# Patient Record
(demographics unavailable — no encounter records)

---

## 2024-11-27 NOTE — HISTORY OF PRESENT ILLNESS
[de-identified] : Burning with Urination [FreeTextEntry6] : Started last night, better today. No fever. Urinary frequency last night, better today. No stomachache, no back pain, no nausea or vomiting. Normal BMs today. No vaginal discharge. No vaginal itching.

## 2024-11-27 NOTE — PHYSICAL EXAM
[NL] : soft, nontender, nondistended, normal bowel sounds, no hepatosplenomegaly [Damaso: ____] : Damaso [unfilled] [Normal External Genitalia] : normal external genitalia [Erythematous Labia Minora] : erythematous labia minora [Erythematous Labia Majora] : erythematous labia majora [Excoriations in Perineum] : no excoriations in perineum [FreeTextEntry9] : no CVA tenderness

## 2024-11-27 NOTE — DISCUSSION/SUMMARY
[FreeTextEntry1] : 8 year F with acute vulvovaginitis. Rec: -Topical Vaseline, Aquaphor, Desitin, or A&D 2-3x/day.  -Avoid sleeper pajamas. Nightgowns allow air to circulate. -Use cotton underpants. Double-rinse underwear after washing to avoid residual irritants. Do not use fabric softeners for underwear and swimsuits. -Avoid tights, leotards, and leggings. Skirts and loose-fitting pants allow air to circulate. -Daily warm bathing is helpful as follows: Allow the child to soak in clean water (no soap) for 10 to 15 minutes. Use soap to wash regions other than the genital area just before taking the child out of the tub. Limit use of any soap on genital areas. Rinse the genital area well and gently pat dry. -A hair dryer on the cool setting may be helpful to assist with drying the genital region. -Do not use bubble baths or perfumed soaps. -If the vulvar area is tender or swollen, cool compresses may relieve the discomfort. Wet wipes can be used instead of toilet paper for wiping. Emollients may help protect skin. -Review hygiene with the child. Emphasize wiping front-to-back after bowel movements. Have her sit with knees apart to reduce reflux of urine into the vagina. If she has trouble with this position because of small size, she can use a smaller detachable seat or sit backwards on the toilet (facing the toilet). Children younger than five should be supervised or assisted in toilet hygiene. -Avoid letting children sit in wet swimsuits for long periods of time after swimming.  Return if symptoms worsen or persist.   Urine dip in office neg. Will send culture to be sure.

## 2024-12-06 NOTE — HISTORY OF PRESENT ILLNESS
[de-identified] : Cough and ADHD [FreeTextEntry6] : x 2 weeks. Getting worse. Waking her up overnight and had 1 episode of post-tussive emesis. Using albuterol which helps some. No fever. Congested. Multiple classmates with walking pneumonia.  Also having difficulty sitting still and focusing with her current dose of ADHD meds. This does was working for a long time but isn't anymore. Rachel would like to try the next dose up.

## 2024-12-06 NOTE — DISCUSSION/SUMMARY
[FreeTextEntry1] : 7 yo F with atypical PNA, likely mycoplasma infection given current high community spread. Will treat with azithromycin + albuterol prn for symptoms. RTC if not improved after abx, worsening, or other new symptoms.  Suboptimal control of ADHD currently so will add 5 mg to current dose and reeval.

## 2024-12-06 NOTE — PHYSICAL EXAM
[NL] : warm, clear [FreeTextEntry7] : rhonchi throughout. scattered end-exp wheezing. no labored breathing.

## 2024-12-06 NOTE — HISTORY OF PRESENT ILLNESS
[de-identified] : Cough and ADHD [FreeTextEntry6] : x 2 weeks. Getting worse. Waking her up overnight and had 1 episode of post-tussive emesis. Using albuterol which helps some. No fever. Congested. Multiple classmates with walking pneumonia.  Also having difficulty sitting still and focusing with her current dose of ADHD meds. This does was working for a long time but isn't anymore. Rachel would like to try the next dose up.

## 2025-02-04 NOTE — HISTORY OF PRESENT ILLNESS
[de-identified] : Sore Throat [FreeTextEntry6] : Started last night. No fever. Is a bit congested. Not really coughing. + headache and felt a little dizzy earlier today. No stomachache. No v/d.

## 2025-02-04 NOTE — DISCUSSION/SUMMARY
[FreeTextEntry1] : 8 year F with likely viral pharyngitis. Rapid strep performed in office is negative. Will send throat culture to rule out strep.   Recommend supportive care with antipyretics, salt water gargles, and if age-appropriate throat lozenges. RTC if fever >3 days, difficulty swallowing, or other new symptoms.   Also with some cobblestoning; can try OTC Claritin or Zyrtec daily to see if it helps.

## 2025-06-19 NOTE — PHYSICAL EXAM
[Alert] : alert [No Acute Distress] : no acute distress [Normocephalic] : normocephalic [Conjunctivae with no discharge] : conjunctivae with no discharge [PERRL] : PERRL [EOMI Bilateral] : EOMI bilateral [Auricles Well Formed] : auricles well formed [Clear Tympanic membranes with present light reflex and bony landmarks] : clear tympanic membranes with present light reflex and bony landmarks [No Discharge] : no discharge [Nares Patent] : nares patent [Pink Nasal Mucosa] : pink nasal mucosa [Palate Intact] : palate intact [Nonerythematous Oropharynx] : nonerythematous oropharynx [Supple, full passive range of motion] : supple, full passive range of motion [No Palpable Masses] : no palpable masses [Symmetric Chest Rise] : symmetric chest rise [Clear to Auscultation Bilaterally] : clear to auscultation bilaterally [Regular Rate and Rhythm] : regular rate and rhythm [Normal S1, S2 present] : normal S1, S2 present [No Murmurs] : no murmurs [+2 Femoral Pulses] : +2 femoral pulses [Soft] : soft [NonTender] : non tender [Non Distended] : non distended [Normoactive Bowel Sounds] : normoactive bowel sounds [No Hepatomegaly] : no hepatomegaly [No Splenomegaly] : no splenomegaly [Patent] : patent [No fissures] : no fissures [No Abnormal Lymph Nodes Palpated] : no abnormal lymph nodes palpated [No Gait Asymmetry] : no gait asymmetry [No pain or deformities with palpation of bone, muscles, joints] : no pain or deformities with palpation of bone, muscles, joints [Normal Muscle Tone] : normal muscle tone [Straight] : straight [+2 Patella DTR] : +2 patella DTR [Cranial Nerves Grossly Intact] : cranial nerves grossly intact [No Rash or Lesions] : no rash or lesions [FreeTextEntry1] : very chatty and active

## 2025-06-19 NOTE — DISCUSSION/SUMMARY
[FreeTextEntry1] :  Well 9 year F  Continue balanced diet with all food groups. Brush teeth twice a day with toothbrush. Recommend visit to dentist. Help child to maintain consistent daily routines and sleep schedule. School discussed. Ensure home is safe. Teach child about personal safety. Use consistent, positive discipline. Limit screen time to no more than 2 hours per day. Encourage physical activity. Child needs to ride in a belt-positioning booster seat until  4 feet 9 inches has been reached and are between 8 and 12 years of age.   -Imms: UTD -Labs: None needed -Minimal weight gain on ADHD meds. Reviewed ways to help and also referred to nutrition for further guidance. Recheck wt in the fall. -Next well visit in 1 year.

## 2025-06-19 NOTE — HISTORY OF PRESENT ILLNESS
[Mother] : mother [Normal] : Normal [In own bed] : In own bed [Brushing teeth twice/d] : brushing teeth twice per day [Yes] : Patient goes to dentist yearly [Toothpaste] : Primary Fluoride Source: Toothpaste [Playtime (60 min/d)] : playtime 60 min a day [Participates in after-school activities] : participates in after-school activities [Appropiate parent-child-sibling interaction] : appropriate parent-child-sibling interaction [Has Friends] : has friends [Has chance to make own decisions] : has chance to make own decisions [Grade ___] : Grade [unfilled] [Adequate social interactions] : adequate social interactions [Adequate behavior] : adequate behavior [Adequate performance] : adequate performance [Adequate attention] : adequate attention [No difficulties with Homework] : no difficulties with homework [Fruit] : fruit [Vegetables] : vegetables [Meat] : meat [Grains] : grains [Eggs] : eggs [Fish] : fish [Dairy] : dairy [Eats healthy meals and snacks] : eats healthy meals and snacks [Eats meals with family] : eats meals with family [de-identified] : Appetite loss from ADHD meds, then gets very cranky and hangry. Sees school counselor for anxiety. [de-identified] :